# Patient Record
Sex: FEMALE | URBAN - METROPOLITAN AREA
[De-identification: names, ages, dates, MRNs, and addresses within clinical notes are randomized per-mention and may not be internally consistent; named-entity substitution may affect disease eponyms.]

---

## 2019-05-03 ENCOUNTER — APPOINTMENT (RX ONLY)
Dept: URBAN - METROPOLITAN AREA CLINIC 38 | Facility: CLINIC | Age: 64
Setting detail: DERMATOLOGY
End: 2019-05-03

## 2019-05-03 DIAGNOSIS — T88.7XX: ICD-10-CM

## 2019-05-03 PROBLEM — T88.7XXA UNSPECIFIED ADVERSE EFFECT OF DRUG OR MEDICAMENT, INITIAL ENCOUNTER: Status: ACTIVE | Noted: 2019-05-03

## 2019-05-03 PROBLEM — Z85.3 PERSONAL HISTORY OF MALIGNANT NEOPLASM OF BREAST: Status: ACTIVE | Noted: 2019-05-03

## 2019-05-03 PROCEDURE — 99202 OFFICE O/P NEW SF 15 MIN: CPT

## 2019-05-03 RX ORDER — PREDNISONE 20 MG/1
TABLET ORAL
Qty: 24 | Refills: 0 | Status: ERX | COMMUNITY
Start: 2019-05-03

## 2019-05-03 RX ADMIN — PREDNISONE: 20 TABLET ORAL at 14:31

## 2019-05-03 NOTE — HPI: RASH
How Severe Is Your Rash?: moderate
Is This A New Presentation, Or A Follow-Up?: Rash
Additional History: Pt went to urgent care and took prednisone and it helped at first but now it’s reappearing again.

## 2020-01-23 ENCOUNTER — NEW PATIENT (OUTPATIENT)
Age: 65
End: 2020-01-23

## 2020-01-23 DIAGNOSIS — H35.371: ICD-10-CM

## 2020-01-23 DIAGNOSIS — H43.22: ICD-10-CM

## 2020-01-23 DIAGNOSIS — H25.13: ICD-10-CM

## 2020-01-23 PROCEDURE — 92134 CPTRZ OPH DX IMG PST SGM RTA: CPT

## 2020-01-23 PROCEDURE — 99204 OFFICE O/P NEW MOD 45 MIN: CPT

## 2020-01-23 PROCEDURE — 92025 CPTRIZED CORNEAL TOPOGRAPHY: CPT

## 2020-01-23 ASSESSMENT — KERATOMETRY
OD_AXISANGLE_DEGREES: 153
OS_K1POWER_DIOPTERS: 42.75
OD_K1POWER_DIOPTERS: 43.25
OS_K2POWER_DIOPTERS: 43.5
OS_AXISANGLE_DEGREES: 32
OD_K2POWER_DIOPTERS: 43.75
OD_AXISANGLE2_DEGREES: 63
OS_AXISANGLE2_DEGREES: 122

## 2020-01-23 ASSESSMENT — VISUAL ACUITY
OS_PH: 20/30+2
OD_PH: 20/30-2
OD_SC: 20/50
OS_SC: 20/40

## 2020-01-23 ASSESSMENT — TONOMETRY
OD_IOP_MMHG: 14
OS_IOP_MMHG: 14

## 2020-02-07 ENCOUNTER — DIAGNOSTICS ONLY (OUTPATIENT)
Age: 65
End: 2020-02-07

## 2020-02-07 DIAGNOSIS — H35.371: ICD-10-CM

## 2020-02-07 DIAGNOSIS — H25.13: ICD-10-CM

## 2020-02-07 PROCEDURE — 92025 CPTRIZED CORNEAL TOPOGRAPHY: CPT

## 2020-02-07 PROCEDURE — 92134 CPTRZ OPH DX IMG PST SGM RTA: CPT

## 2020-02-07 PROCEDURE — 92136 OPHTHALMIC BIOMETRY: CPT

## 2020-02-07 ASSESSMENT — KERATOMETRY
OS_AXISANGLE_DEGREES: 32
OS_K2POWER_DIOPTERS: 43.5
OD_K1POWER_DIOPTERS: 43.25
OS_AXISANGLE2_DEGREES: 122
OS_K1POWER_DIOPTERS: 42.75
OD_AXISANGLE2_DEGREES: 63
OD_AXISANGLE_DEGREES: 153
OD_K2POWER_DIOPTERS: 43.75

## 2020-02-21 ENCOUNTER — POST-OP CHECK (OUTPATIENT)
Age: 65
End: 2020-02-21

## 2020-02-21 ENCOUNTER — SURGERY/PROCEDURE (OUTPATIENT)
Age: 65
End: 2020-02-21

## 2020-02-21 DIAGNOSIS — Z96.1: ICD-10-CM

## 2020-02-21 DIAGNOSIS — H25.11: ICD-10-CM

## 2020-02-21 DIAGNOSIS — H25.12: ICD-10-CM

## 2020-02-21 PROCEDURE — 66984 XCAPSL CTRC RMVL W/O ECP: CPT

## 2020-02-21 PROCEDURE — 99024 POSTOP FOLLOW-UP VISIT: CPT

## 2020-02-21 ASSESSMENT — TONOMETRY: OD_IOP_MMHG: 12

## 2020-02-21 ASSESSMENT — VISUAL ACUITY: OD_SC: 20/40+2

## 2020-02-27 ENCOUNTER — POST-OP CHECK (OUTPATIENT)
Age: 65
End: 2020-02-27

## 2020-02-27 DIAGNOSIS — Z96.1: ICD-10-CM

## 2020-02-27 DIAGNOSIS — H25.12: ICD-10-CM

## 2020-02-27 PROCEDURE — 99024 POSTOP FOLLOW-UP VISIT: CPT

## 2020-02-27 PROCEDURE — 92136 OPHTHALMIC BIOMETRY: CPT | Mod: 26

## 2020-02-27 ASSESSMENT — TONOMETRY: OD_IOP_MMHG: 07

## 2020-02-27 ASSESSMENT — KERATOMETRY
OS_K1POWER_DIOPTERS: 42.75
OS_K2POWER_DIOPTERS: 43.5
OD_K2POWER_DIOPTERS: 43.5
OS_AXISANGLE_DEGREES: 28
OD_AXISANGLE_DEGREES: 11
OD_AXISANGLE2_DEGREES: 101
OS_AXISANGLE2_DEGREES: 118
OD_K1POWER_DIOPTERS: 43

## 2020-02-27 ASSESSMENT — VISUAL ACUITY
OS_SC: 20/40
OD_SC: 20/20

## 2020-03-06 ENCOUNTER — POST-OP CHECK (OUTPATIENT)
Age: 65
End: 2020-03-06

## 2020-03-06 ENCOUNTER — SURGERY/PROCEDURE (OUTPATIENT)
Age: 65
End: 2020-03-06

## 2020-03-06 DIAGNOSIS — H25.812: ICD-10-CM

## 2020-03-06 DIAGNOSIS — Z96.1: ICD-10-CM

## 2020-03-06 PROCEDURE — 99024 POSTOP FOLLOW-UP VISIT: CPT

## 2020-03-06 PROCEDURE — 66984 XCAPSL CTRC RMVL W/O ECP: CPT | Mod: 79,LT

## 2020-03-06 ASSESSMENT — VISUAL ACUITY
OD_SC: 20/20
OS_SC: J2

## 2020-03-06 ASSESSMENT — KERATOMETRY
OS_K2POWER_DIOPTERS: 43.5
OD_K1POWER_DIOPTERS: 43
OS_AXISANGLE_DEGREES: 28
OD_K1POWER_DIOPTERS: 43
OS_AXISANGLE2_DEGREES: 118
OD_AXISANGLE2_DEGREES: 101
OD_AXISANGLE2_DEGREES: 101
OS_K2POWER_DIOPTERS: 43.5
OD_K2POWER_DIOPTERS: 43.5
OD_AXISANGLE_DEGREES: 11
OS_K1POWER_DIOPTERS: 42.75
OS_K1POWER_DIOPTERS: 42.75
OD_K2POWER_DIOPTERS: 43.5
OS_AXISANGLE_DEGREES: 28
OS_AXISANGLE2_DEGREES: 118
OD_AXISANGLE_DEGREES: 11

## 2020-03-06 ASSESSMENT — TONOMETRY
OD_IOP_MMHG: 11
OS_IOP_MMHG: 9

## 2020-03-12 ENCOUNTER — POST-OP CHECK (OUTPATIENT)
Age: 65
End: 2020-03-12

## 2020-03-12 DIAGNOSIS — Z96.1: ICD-10-CM

## 2020-03-12 PROCEDURE — 99024 POSTOP FOLLOW-UP VISIT: CPT

## 2020-03-12 ASSESSMENT — TONOMETRY
OS_IOP_MMHG: 11
OD_IOP_MMHG: 10

## 2020-03-12 ASSESSMENT — VISUAL ACUITY
OS_SC: 20/70-2
OU_SC: J1
OD_SC: 20/20

## 2020-03-12 ASSESSMENT — KERATOMETRY
OS_K1POWER_DIOPTERS: 42.75
OS_AXISANGLE2_DEGREES: 118
OD_AXISANGLE2_DEGREES: 101
OS_AXISANGLE_DEGREES: 28
OD_AXISANGLE_DEGREES: 11
OD_K2POWER_DIOPTERS: 43.5
OS_K2POWER_DIOPTERS: 43.5
OD_K1POWER_DIOPTERS: 43

## 2020-05-18 ENCOUNTER — POST-OP CHECK (OUTPATIENT)
Age: 65
End: 2020-05-18

## 2020-05-18 DIAGNOSIS — Z96.1: ICD-10-CM

## 2020-05-18 PROCEDURE — 99024 POSTOP FOLLOW-UP VISIT: CPT

## 2020-05-18 ASSESSMENT — VISUAL ACUITY
OD_SC: 20/20-1
OS_SC: J1+

## 2020-05-18 ASSESSMENT — KERATOMETRY
OD_AXISANGLE_DEGREES: 170
OS_K2POWER_DIOPTERS: 43.75
OD_K1POWER_DIOPTERS: 43.25
OS_AXISANGLE_DEGREES: 24
OS_AXISANGLE2_DEGREES: 114
OD_K2POWER_DIOPTERS: 43.5
OS_K1POWER_DIOPTERS: 42.75
OD_AXISANGLE2_DEGREES: 80

## 2020-05-18 ASSESSMENT — TONOMETRY
OS_IOP_MMHG: 10
OD_IOP_MMHG: 11

## 2020-11-17 ENCOUNTER — DILATED FUNDUS EXAM (OUTPATIENT)
Age: 65
End: 2020-11-17

## 2020-11-17 DIAGNOSIS — H35.371: ICD-10-CM

## 2020-11-17 DIAGNOSIS — H53.19: ICD-10-CM

## 2020-11-17 DIAGNOSIS — H43.22: ICD-10-CM

## 2020-11-17 DIAGNOSIS — Z96.1: ICD-10-CM

## 2020-11-17 PROCEDURE — 99214 OFFICE O/P EST MOD 30 MIN: CPT

## 2020-11-17 PROCEDURE — 92134 CPTRZ OPH DX IMG PST SGM RTA: CPT

## 2020-11-17 ASSESSMENT — TONOMETRY
OS_IOP_MMHG: 10
OD_IOP_MMHG: 10

## 2020-11-17 ASSESSMENT — VISUAL ACUITY
OD_SC: 20/20
OU_SC: J1+

## 2020-11-17 ASSESSMENT — KERATOMETRY
OD_K1POWER_DIOPTERS: 43.25
OS_K2POWER_DIOPTERS: 43.5
OD_AXISANGLE_DEGREES: 170
OS_AXISANGLE2_DEGREES: 112
OS_K2POWER_DIOPTERS: 43.75
OD_K2POWER_DIOPTERS: 43.75
OS_AXISANGLE_DEGREES: 22
OD_AXISANGLE2_DEGREES: 80
OD_K2POWER_DIOPTERS: 43.5
OS_AXISANGLE2_DEGREES: 114
OD_AXISANGLE_DEGREES: 179
OS_AXISANGLE_DEGREES: 24
OS_K1POWER_DIOPTERS: 42.75
OD_AXISANGLE2_DEGREES: 89

## 2021-01-15 ENCOUNTER — DILATED FUNDUS EXAM (OUTPATIENT)
Age: 66
End: 2021-01-15

## 2021-01-15 DIAGNOSIS — H53.19: ICD-10-CM

## 2021-01-15 DIAGNOSIS — Z96.1: ICD-10-CM

## 2021-01-15 DIAGNOSIS — H35.371: ICD-10-CM

## 2021-01-15 DIAGNOSIS — H43.22: ICD-10-CM

## 2021-01-15 PROCEDURE — 99214 OFFICE O/P EST MOD 30 MIN: CPT

## 2021-01-15 ASSESSMENT — KERATOMETRY
OS_AXISANGLE_DEGREES: 24
OD_AXISANGLE_DEGREES: 179
OD_K1POWER_DIOPTERS: 43.25
OD_K2POWER_DIOPTERS: 43.5
OS_K1POWER_DIOPTERS: 42.75
OD_AXISANGLE2_DEGREES: 89
OS_AXISANGLE_DEGREES: 22
OD_K2POWER_DIOPTERS: 43.75
OS_AXISANGLE2_DEGREES: 112
OD_AXISANGLE_DEGREES: 170
OS_K2POWER_DIOPTERS: 43.5
OD_AXISANGLE2_DEGREES: 80
OS_K2POWER_DIOPTERS: 43.75
OS_AXISANGLE2_DEGREES: 114

## 2021-01-15 ASSESSMENT — VISUAL ACUITY
OS_SC: J2
OS_SC: 20/150
OD_SC: 20/20

## 2021-01-15 ASSESSMENT — TONOMETRY
OD_IOP_MMHG: 09
OS_IOP_MMHG: 09

## 2022-11-17 ENCOUNTER — NEW PATIENT (OUTPATIENT)
Dept: URBAN - METROPOLITAN AREA CLINIC 101 | Facility: CLINIC | Age: 67
End: 2022-11-17

## 2022-11-17 DIAGNOSIS — H43.811: ICD-10-CM

## 2022-11-17 DIAGNOSIS — H35.373: ICD-10-CM

## 2022-11-17 DIAGNOSIS — H43.22: ICD-10-CM

## 2022-11-17 DIAGNOSIS — H43.392: ICD-10-CM

## 2022-11-17 DIAGNOSIS — H43.822: ICD-10-CM

## 2022-11-17 PROCEDURE — 92134 CPTRZ OPH DX IMG PST SGM RTA: CPT

## 2022-11-17 PROCEDURE — 92201 OPSCPY EXTND RTA DRAW UNI/BI: CPT

## 2022-11-17 PROCEDURE — 99204 OFFICE O/P NEW MOD 45 MIN: CPT

## 2022-11-17 ASSESSMENT — VISUAL ACUITY
OS_CC: 20/25+1
OD_CC: 20/25

## 2022-11-17 ASSESSMENT — TONOMETRY
OD_IOP_MMHG: 15
OS_IOP_MMHG: 16

## 2022-12-15 ENCOUNTER — FOLLOW UP (OUTPATIENT)
Dept: URBAN - METROPOLITAN AREA CLINIC 101 | Facility: CLINIC | Age: 67
End: 2022-12-15

## 2022-12-15 DIAGNOSIS — H43.822: ICD-10-CM

## 2022-12-15 DIAGNOSIS — H43.22: ICD-10-CM

## 2022-12-15 DIAGNOSIS — H43.392: ICD-10-CM

## 2022-12-15 DIAGNOSIS — H35.373: ICD-10-CM

## 2022-12-15 DIAGNOSIS — H43.811: ICD-10-CM

## 2022-12-15 PROCEDURE — 92014 COMPRE OPH EXAM EST PT 1/>: CPT

## 2022-12-15 PROCEDURE — 92202 OPSCPY EXTND ON/MAC DRAW: CPT

## 2022-12-15 PROCEDURE — 92134 CPTRZ OPH DX IMG PST SGM RTA: CPT

## 2022-12-15 ASSESSMENT — TONOMETRY
OD_IOP_MMHG: 14
OS_IOP_MMHG: 14

## 2022-12-15 ASSESSMENT — VISUAL ACUITY
OD_CC: 20/20-1
OS_PH: 20/20-1
OS_CC: 20/25-1

## 2023-03-13 ENCOUNTER — 3 MONTH FOLLOW UP (OUTPATIENT)
Dept: URBAN - METROPOLITAN AREA CLINIC 101 | Facility: CLINIC | Age: 68
End: 2023-03-13

## 2023-03-13 DIAGNOSIS — H43.811: ICD-10-CM

## 2023-03-13 DIAGNOSIS — H43.22: ICD-10-CM

## 2023-03-13 DIAGNOSIS — H35.373: ICD-10-CM

## 2023-03-13 DIAGNOSIS — H43.392: ICD-10-CM

## 2023-03-13 DIAGNOSIS — H43.822: ICD-10-CM

## 2023-03-13 PROCEDURE — 92014 COMPRE OPH EXAM EST PT 1/>: CPT

## 2023-03-13 PROCEDURE — 92134 CPTRZ OPH DX IMG PST SGM RTA: CPT

## 2023-03-13 PROCEDURE — 92201 OPSCPY EXTND RTA DRAW UNI/BI: CPT

## 2023-03-13 ASSESSMENT — TONOMETRY
OD_IOP_MMHG: 12
OS_IOP_MMHG: 12

## 2023-03-13 ASSESSMENT — VISUAL ACUITY
OD_CC: 20/20
OS_CC: 20/20

## 2023-10-16 ENCOUNTER — FOLLOW UP (OUTPATIENT)
Dept: URBAN - METROPOLITAN AREA CLINIC 101 | Facility: CLINIC | Age: 68
End: 2023-10-16

## 2023-10-16 DIAGNOSIS — H43.811: ICD-10-CM

## 2023-10-16 DIAGNOSIS — H43.822: ICD-10-CM

## 2023-10-16 DIAGNOSIS — H43.22: ICD-10-CM

## 2023-10-16 DIAGNOSIS — H43.392: ICD-10-CM

## 2023-10-16 DIAGNOSIS — H35.373: ICD-10-CM

## 2023-10-16 PROCEDURE — 92014 COMPRE OPH EXAM EST PT 1/>: CPT

## 2023-10-16 PROCEDURE — 92134 CPTRZ OPH DX IMG PST SGM RTA: CPT

## 2023-10-16 PROCEDURE — 92202 OPSCPY EXTND ON/MAC DRAW: CPT

## 2023-10-16 ASSESSMENT — VISUAL ACUITY
OD_CC: 20/25+2
OS_CC: 20/25+1

## 2023-10-16 ASSESSMENT — TONOMETRY
OS_IOP_MMHG: 13
OD_IOP_MMHG: 12

## 2024-11-07 ENCOUNTER — FOLLOW UP (OUTPATIENT)
Dept: URBAN - METROPOLITAN AREA CLINIC 101 | Facility: CLINIC | Age: 69
End: 2024-11-07

## 2024-11-07 DIAGNOSIS — H35.373: ICD-10-CM

## 2024-11-07 DIAGNOSIS — H43.392: ICD-10-CM

## 2024-11-07 DIAGNOSIS — H43.822: ICD-10-CM

## 2024-11-07 DIAGNOSIS — H43.22: ICD-10-CM

## 2024-11-07 DIAGNOSIS — H43.811: ICD-10-CM

## 2024-11-07 PROCEDURE — 92014 COMPRE OPH EXAM EST PT 1/>: CPT

## 2024-11-07 PROCEDURE — 92134 CPTRZ OPH DX IMG PST SGM RTA: CPT

## 2024-11-07 PROCEDURE — 92202 OPSCPY EXTND ON/MAC DRAW: CPT

## 2024-11-07 ASSESSMENT — VISUAL ACUITY
OD_CC: 20/20
OS_CC: 20/20

## 2024-11-07 ASSESSMENT — TONOMETRY
OD_IOP_MMHG: 10
OS_IOP_MMHG: 10